# Patient Record
Sex: MALE | Race: BLACK OR AFRICAN AMERICAN | Employment: PART TIME | URBAN - METROPOLITAN AREA
[De-identification: names, ages, dates, MRNs, and addresses within clinical notes are randomized per-mention and may not be internally consistent; named-entity substitution may affect disease eponyms.]

---

## 2019-08-24 ENCOUNTER — HOSPITAL ENCOUNTER (OUTPATIENT)
Age: 19
Discharge: HOME | End: 2019-08-24
Attending: FAMILY MEDICINE
Payer: COMMERCIAL

## 2019-08-24 VITALS
SYSTOLIC BLOOD PRESSURE: 138 MMHG | RESPIRATION RATE: 18 BRPM | DIASTOLIC BLOOD PRESSURE: 75 MMHG | OXYGEN SATURATION: 100 % | TEMPERATURE: 97 F | HEART RATE: 86 BPM

## 2019-08-24 DIAGNOSIS — Z48.02 ENCOUNTER FOR REMOVAL OF SUTURES: Primary | ICD-10-CM

## 2019-08-24 PROCEDURE — 99201 HC OUTPT EVAL AND MGNT NEW PT LEVEL 1: CPT

## 2019-08-24 PROCEDURE — 99201 PR OUTPT EVAL AND MGNT NEW PT LEVEL 1: CPT

## 2019-08-24 NOTE — ED PROVIDER NOTES
Patient Seen in: [address] [facility] [address]    History   Patient presents with:  Lety Mensah (ingtegumentary)    Stated Complaint: SUTURE REMOVAL    HPI    23yo M with no sig PMHx presents to IC for suture removal.  Laceration display      MDM   5 sutures removed without complication. Discussed wound care and s/s of infection. Given contact info for PCP to establish care with. RTC for any symptom development. Verbal understanding obtained.                Disposition and Plan

## 2019-08-24 NOTE — ED INITIAL ASSESSMENT (HPI)
Pt here for suture removal to his left lateral eye brow area, pt states he had sutures placed in [facility] last week, pt denies any redness or fevers

## 2023-04-06 ENCOUNTER — APPOINTMENT (OUTPATIENT)
Dept: GENERAL RADIOLOGY | Age: 23
End: 2023-04-06
Attending: NURSE PRACTITIONER
Payer: COMMERCIAL

## 2023-04-06 ENCOUNTER — HOSPITAL ENCOUNTER (OUTPATIENT)
Age: 23
Discharge: HOME | End: 2023-04-06
Payer: COMMERCIAL

## 2023-04-06 VITALS
DIASTOLIC BLOOD PRESSURE: 73 MMHG | TEMPERATURE: 98 F | RESPIRATION RATE: 19 BRPM | OXYGEN SATURATION: 100 % | HEART RATE: 88 BPM | SYSTOLIC BLOOD PRESSURE: 120 MMHG

## 2023-04-06 DIAGNOSIS — M54.6 ACUTE RIGHT-SIDED THORACIC BACK PAIN: Primary | ICD-10-CM

## 2023-04-06 PROCEDURE — 71101 X-RAY EXAM UNILAT RIBS/CHEST: CPT | Performed by: NURSE PRACTITIONER

## 2023-04-06 PROCEDURE — 99203 OFFICE O/P NEW LOW 30 MIN: CPT | Performed by: NURSE PRACTITIONER

## 2023-04-06 RX ORDER — CYCLOBENZAPRINE HCL 10 MG
10 TABLET ORAL 3 TIMES DAILY PRN
Qty: 20 TABLET | Refills: 0 | Status: SHIPPED | OUTPATIENT
Start: 2023-04-06 | End: 2023-04-13

## 2023-04-06 RX ORDER — IBUPROFEN 800 MG/1
800 TABLET ORAL EVERY 8 HOURS PRN
Qty: 30 TABLET | Refills: 0 | Status: SHIPPED | OUTPATIENT
Start: 2023-04-06 | End: 2023-04-13

## 2023-04-06 NOTE — ED INITIAL ASSESSMENT (HPI)
Pt states having some pain that he noticed 2 days ago having some pain in right ribs when brushing teeth. Pt denies trauma to area but states was lifting heavy that day. Pt states does reguarlly lift weight . Pt states if he takes a deep breath it will be painful on right side.

## 2023-04-07 NOTE — DISCHARGE INSTRUCTIONS
Ice to the area of soreness. Gentle stretching exercises. Take the medications as prescribed. The muscle relaxant may make you drowsy, so do not take while driving. Follow-up with your doctor. Return for any concerns.